# Patient Record
Sex: FEMALE | Race: WHITE | NOT HISPANIC OR LATINO | Employment: FULL TIME | URBAN - METROPOLITAN AREA
[De-identification: names, ages, dates, MRNs, and addresses within clinical notes are randomized per-mention and may not be internally consistent; named-entity substitution may affect disease eponyms.]

---

## 2021-01-19 ENCOUNTER — APPOINTMENT (OUTPATIENT)
Dept: RADIOLOGY | Facility: CLINIC | Age: 56
End: 2021-01-19
Payer: COMMERCIAL

## 2021-01-19 ENCOUNTER — OFFICE VISIT (OUTPATIENT)
Dept: URGENT CARE | Facility: CLINIC | Age: 56
End: 2021-01-19
Payer: COMMERCIAL

## 2021-01-19 VITALS
TEMPERATURE: 99.4 F | BODY MASS INDEX: 37.56 KG/M2 | SYSTOLIC BLOOD PRESSURE: 161 MMHG | WEIGHT: 220 LBS | HEART RATE: 84 BPM | RESPIRATION RATE: 14 BRPM | HEIGHT: 64 IN | DIASTOLIC BLOOD PRESSURE: 101 MMHG

## 2021-01-19 DIAGNOSIS — M25.461 EFFUSION OF RIGHT KNEE: ICD-10-CM

## 2021-01-19 DIAGNOSIS — M25.561 ACUTE PAIN OF RIGHT KNEE: ICD-10-CM

## 2021-01-19 DIAGNOSIS — M25.561 ACUTE PAIN OF RIGHT KNEE: Primary | ICD-10-CM

## 2021-01-19 PROCEDURE — 73564 X-RAY EXAM KNEE 4 OR MORE: CPT

## 2021-01-19 PROCEDURE — 99203 OFFICE O/P NEW LOW 30 MIN: CPT | Performed by: FAMILY MEDICINE

## 2021-01-19 RX ORDER — MELOXICAM 15 MG/1
15 TABLET ORAL DAILY
Qty: 60 TABLET | Refills: 0 | Status: SHIPPED | OUTPATIENT
Start: 2021-01-19

## 2021-01-19 NOTE — PROGRESS NOTES
[facility]        NAME: Kalli Tvoar is a 48 y o  female  : 1965    MRN: [id number]  DATE: 2021  TIME: 3:37 PM    Assessment and Plan   Acute pain of right knee [M25 561]  1  Acute pain of right knee  XR knee 4+ vw right injury    Ambulatory referral to Orthopedic Surgery    meloxicam (MOBIC) 15 mg tablet   2  Effusion of right knee  Ambulatory referral to Orthopedic Surgery    meloxicam (MOBIC) 15 mg tablet         Patient Instructions       Follow up with PCP in 3-5 days  Proceed to  ER if symptoms worsen  Chief Complaint     Chief Complaint   Patient presents with    Knee Injury      Pain in right knee s/p fall while skiing today  Patient states she hyper-extended  knee and feels like knee will give out when standing   Ice used to control pain  History of Present Illness         56-year-old female presenting today for evaluation right knee pain  She reports 2 hours ago falling down a hill while skiing  She reports hyperextending her right knee as her ski got caught in the snow as she fell forward  She presents at this time with ongoing pain along the medial posterior aspect of the right knee  She does report instability with weight-bearing on the right knee and feels as if her knee is going to give out  She does also report symptoms of walking when she attempts to bend the knee  Denies any joint warmth or crepitus  Denies any numbness or tingling  Review of Systems   Review of Systems   Constitutional: Negative  HENT: Negative  Eyes: Negative  Respiratory: Negative  Cardiovascular: Negative  Gastrointestinal: Negative  Genitourinary: Negative  Musculoskeletal: Positive for arthralgias and myalgias  Skin: Negative  Allergic/Immunologic: Negative  Neurological: Negative  Hematological: Negative  Psychiatric/Behavioral: Negative            Current Medications       Current Outpatient Medications:     meloxicam (MOBIC) 15 mg tablet, Take 1 tablet (15 mg total) by mouth daily, Disp: 60 tablet, Rfl: 0    Current Allergies     Allergies as of 01/19/2021    (No Known Allergies)            The following portions of the patient's history were reviewed and updated as appropriate: allergies, current medications, past family history, past medical history, past social history, past surgical history and problem list      History reviewed  No pertinent past medical history  Past Surgical History:   Procedure Laterality Date    WISDOM TOOTH EXTRACTION         Family History   Problem Relation Age of Onset    Hydrocephalus Mother         low pressure    Heart disease Father     Kidney failure Father          Medications have been verified  Objective   BP (!) 161/101 (BP Location: Left arm, Patient Position: Sitting, Cuff Size: Large)   Pulse 84   Temp 99 4 °F (37 4 °C) (Tympanic)   Resp 14   Ht 5' 4" (1 626 m)   Wt 99 8 kg (220 lb)   BMI 37 76 kg/m²   No LMP recorded  Physical Exam     Physical Exam  Musculoskeletal:      Right shoulder: She exhibits decreased range of motion, tenderness, swelling and effusion